# Patient Record
Sex: FEMALE | Race: WHITE | Employment: FULL TIME | URBAN - METROPOLITAN AREA
[De-identification: names, ages, dates, MRNs, and addresses within clinical notes are randomized per-mention and may not be internally consistent; named-entity substitution may affect disease eponyms.]

---

## 2019-05-08 ENCOUNTER — HOSPITAL ENCOUNTER (OUTPATIENT)
Age: 29
Discharge: HOME | End: 2019-05-08

## 2019-05-08 LAB — RUBV IGG SER QL: 23.4 IU/ML

## 2019-05-08 PROCEDURE — 86762 RUBELLA ANTIBODY: CPT

## 2019-05-08 PROCEDURE — 86787 VARICELLA-ZOSTER ANTIBODY: CPT

## 2019-05-08 PROCEDURE — 36415 COLL VENOUS BLD VENIPUNCTURE: CPT

## 2019-05-08 PROCEDURE — 86735 MUMPS ANTIBODY: CPT

## 2019-05-08 PROCEDURE — 86481 TB AG RESPONSE T-CELL SUSP: CPT

## 2019-05-08 PROCEDURE — 86765 RUBEOLA ANTIBODY: CPT

## 2019-05-10 LAB
MEASLES IMMUNE (IGG): 1.72
MUV IGG SER QL: 1.81
VZV IGG SER QL IA: 1.5

## 2019-05-11 LAB — T-SPOT TB TEST: NORMAL

## 2019-06-04 ENCOUNTER — OFFICE VISIT (OUTPATIENT)
Dept: PRIMARY CARE CLINIC | Age: 29
End: 2019-06-04
Payer: COMMERCIAL

## 2019-06-04 VITALS
TEMPERATURE: 97.3 F | HEIGHT: 63 IN | HEART RATE: 80 BPM | WEIGHT: 222 LBS | BODY MASS INDEX: 39.34 KG/M2 | DIASTOLIC BLOOD PRESSURE: 87 MMHG | SYSTOLIC BLOOD PRESSURE: 123 MMHG | OXYGEN SATURATION: 98 %

## 2019-06-04 DIAGNOSIS — J02.9 SORE THROAT: Primary | ICD-10-CM

## 2019-06-04 DIAGNOSIS — J20.9 ACUTE BRONCHITIS, UNSPECIFIED ORGANISM: ICD-10-CM

## 2019-06-04 LAB — S PYO AG THROAT QL: NORMAL

## 2019-06-04 PROCEDURE — 99202 OFFICE O/P NEW SF 15 MIN: CPT | Performed by: INTERNAL MEDICINE

## 2019-06-04 PROCEDURE — 87880 STREP A ASSAY W/OPTIC: CPT | Performed by: INTERNAL MEDICINE

## 2019-06-04 RX ORDER — BUSPIRONE HYDROCHLORIDE 5 MG/1
5 TABLET ORAL
COMMUNITY

## 2019-06-04 RX ORDER — VENLAFAXINE HYDROCHLORIDE 150 MG/1
150 TABLET, EXTENDED RELEASE ORAL
COMMUNITY

## 2019-06-04 RX ORDER — AZITHROMYCIN 250 MG/1
TABLET, FILM COATED ORAL
Qty: 1 PACKET | Refills: 0 | Status: SHIPPED | OUTPATIENT
Start: 2019-06-04 | End: 2019-12-01

## 2019-06-04 RX ORDER — OMEPRAZOLE 20 MG/1
1 CAPSULE, DELAYED RELEASE ORAL
COMMUNITY

## 2019-06-04 RX ORDER — DICYCLOMINE HCL 20 MG
20 TABLET ORAL
COMMUNITY
Start: 2019-04-03

## 2019-06-04 ASSESSMENT — PATIENT HEALTH QUESTIONNAIRE - PHQ9
SUM OF ALL RESPONSES TO PHQ9 QUESTIONS 1 & 2: 0
SUM OF ALL RESPONSES TO PHQ QUESTIONS 1-9: 0
SUM OF ALL RESPONSES TO PHQ QUESTIONS 1-9: 0
2. FEELING DOWN, DEPRESSED OR HOPELESS: 0
1. LITTLE INTEREST OR PLEASURE IN DOING THINGS: 0

## 2019-06-04 ASSESSMENT — ENCOUNTER SYMPTOMS
COUGH: 1
SORE THROAT: 1

## 2019-06-04 NOTE — PROGRESS NOTES
[facility]  [address] [address]  Dept: [phone]  Dept Fax: [phone]    Jordi Hall is a 33 y.o. female who presents today for   Chief Complaint   Patient presents with    Cough     For about 4 days    Pharyngitis    and follow upof chronic medical problems: There is no problem list on file for this patient. .    No past medical history on file. No past surgical history on file. No family history on file. Social History     Tobacco Use    Smoking status: Never Smoker    Smokeless tobacco: Never Used   Substance Use Topics    Alcohol use: Not on file      Current Outpatient Medications   Medication Sig Dispense Refill    dicyclomine (BENTYL) 20 MG tablet Take 20 mg by mouth      busPIRone (BUSPAR) 5 MG tablet Take 5 mg by mouth      venlafaxine 150 MG extended release tablet Take 150 mg by mouth      RIZATRIPTAN BENZOATE PO Take by mouth      omeprazole (PRILOSEC) 20 MG delayed release capsule Take 1 tablet by mouth       No current facility-administered medications for this visit. No Known Allergies    Health Maintenance   Topic Date Due    Varicella Vaccine (1 of 2 - 13+ 2-dose series) 08/30/2004    HIV screen  08/30/2006    DTaP/Tdap/Td vaccine (1 - Tdap) 08/30/2010    Cervical cancer screen  08/30/2012    Flu vaccine  Completed    HPV vaccine  Aged Out    Pneumococcal 0-64 years Vaccine  Aged Out       Controlled Substances Monitoring:      HPI:     Cough   This is a new problem. The current episode started in the past 7 days. The problem has been gradually worsening. The cough is productive of sputum (green sputum). Associated symptoms include a sore throat. Nothing aggravates the symptoms. She has tried OTC cough suppressant for the symptoms. The treatment provided mild relief. Pharyngitis   This is a new problem. The current episode started in the past 7 days.  The problem has been unchanged. Associated symptoms include coughing and a sore throat. She has tried nothing (see above) for the symptoms. ROS:     Review of Systems   HENT: Positive for sore throat. Respiratory: Positive for cough. All other systems reviewed and are negative. Objective:     Physical Exam   Constitutional: She is oriented to person, place, and time. She appears well-developed and well-nourished. HENT:   Head: Normocephalic and atraumatic. Right Ear: External ear normal.   Left Ear: External ear normal.   Mouth/Throat: Oropharynx is clear and moist.   Neurological: She is alert and oriented to person, place, and time. Skin: Skin is warm and dry. Psychiatric: She has a normal mood and affect. Her behavior is normal.   Vitals reviewed. Strep screen negative.

## 2019-06-04 NOTE — PROGRESS NOTES
History Review  Past Medical, Family, and Social History reviewed and does contribute to the patient presenting condition    Health Maintenance   Topic Date Due    Varicella Vaccine (1 of 2 - 13+ 2-dose series) 08/30/2004    HIV screen  08/30/2006    DTaP/Tdap/Td vaccine (1 - Tdap) 08/30/2010    Cervical cancer screen  08/30/2012    Flu vaccine (Season Ended) 09/01/2019    HPV vaccine  Aged Out    Pneumococcal 0-64 years Vaccine  Aged Out

## 2019-12-01 ENCOUNTER — OFFICE VISIT (OUTPATIENT)
Dept: PRIMARY CARE CLINIC | Age: 29
End: 2019-12-01
Payer: COMMERCIAL

## 2019-12-01 VITALS
OXYGEN SATURATION: 97 % | DIASTOLIC BLOOD PRESSURE: 98 MMHG | BODY MASS INDEX: 43.58 KG/M2 | HEART RATE: 99 BPM | WEIGHT: 246 LBS | TEMPERATURE: 96.8 F | SYSTOLIC BLOOD PRESSURE: 145 MMHG

## 2019-12-01 DIAGNOSIS — J34.89 SINUS PRESSURE: ICD-10-CM

## 2019-12-01 DIAGNOSIS — H66.92 LEFT OTITIS MEDIA, UNSPECIFIED OTITIS MEDIA TYPE: Primary | ICD-10-CM

## 2019-12-01 DIAGNOSIS — R05.9 COUGH: ICD-10-CM

## 2019-12-01 DIAGNOSIS — Z91.89 AT RISK FOR SIDE EFFECT OF MEDICATION: ICD-10-CM

## 2019-12-01 PROCEDURE — 99202 OFFICE O/P NEW SF 15 MIN: CPT | Performed by: NURSE PRACTITIONER

## 2019-12-01 RX ORDER — BROMPHENIRAMINE MALEATE, PSEUDOEPHEDRINE HYDROCHLORIDE, AND DEXTROMETHORPHAN HYDROBROMIDE 2; 30; 10 MG/5ML; MG/5ML; MG/5ML
5 SYRUP ORAL 4 TIMES DAILY PRN
Qty: 120 ML | Refills: 0 | Status: SHIPPED | OUTPATIENT
Start: 2019-12-01

## 2019-12-01 RX ORDER — FLUCONAZOLE 150 MG/1
150 TABLET ORAL ONCE
Qty: 1 TABLET | Refills: 0 | Status: SHIPPED | OUTPATIENT
Start: 2019-12-01 | End: 2019-12-01

## 2019-12-01 RX ORDER — AMOXICILLIN AND CLAVULANATE POTASSIUM 875; 125 MG/1; MG/1
1 TABLET, FILM COATED ORAL 2 TIMES DAILY
Qty: 20 TABLET | Refills: 0 | Status: SHIPPED | OUTPATIENT
Start: 2019-12-01 | End: 2019-12-11

## 2019-12-01 ASSESSMENT — ENCOUNTER SYMPTOMS
EYE ITCHING: 0
SINUS PRESSURE: 1
EYE DISCHARGE: 0
SHORTNESS OF BREATH: 0
COUGH: 1
RHINORRHEA: 1
SINUS PAIN: 0
SORE THROAT: 1

## 2020-04-06 ENCOUNTER — NURSE TRIAGE (OUTPATIENT)
Dept: OTHER | Facility: CLINIC | Age: 30
End: 2020-04-06

## 2020-08-28 ENCOUNTER — EMPLOYEE WELLNESS (OUTPATIENT)
Dept: OTHER | Age: 30
End: 2020-08-28

## 2020-08-28 LAB
CHOLESTEROL/HDL RATIO: 4
CHOLESTEROL: 178 MG/DL
GLUCOSE BLD-MCNC: 105 MG/DL (ref 70–99)
HDLC SERPL-MCNC: 44 MG/DL
LDL CHOLESTEROL: 92 MG/DL (ref 0–130)
PATIENT FASTING?: YES
TRIGL SERPL-MCNC: 211 MG/DL
VLDLC SERPL CALC-MCNC: ABNORMAL MG/DL (ref 1–30)

## 2020-10-19 VITALS — BODY MASS INDEX: 47.3 KG/M2 | WEIGHT: 267 LBS

## 2021-03-16 ENCOUNTER — HOSPITAL ENCOUNTER (OUTPATIENT)
Dept: MRI IMAGING | Age: 31
Discharge: HOME | End: 2021-03-18
Payer: COMMERCIAL

## 2021-03-16 DIAGNOSIS — R10.13 EPIGASTRIC PAIN: ICD-10-CM

## 2021-03-16 PROCEDURE — 2500000003 HC RX 250 WO HCPCS: Performed by: INTERNAL MEDICINE

## 2021-03-16 PROCEDURE — 2580000003 HC RX 258: Performed by: INTERNAL MEDICINE

## 2021-03-16 PROCEDURE — 6360000004 HC RX CONTRAST MEDICATION: Performed by: INTERNAL MEDICINE

## 2021-03-16 PROCEDURE — 72197 MRI PELVIS W/O & W/DYE: CPT

## 2021-03-16 PROCEDURE — A9579 GAD-BASE MR CONTRAST NOS,1ML: HCPCS | Performed by: INTERNAL MEDICINE

## 2021-03-16 RX ORDER — SODIUM CHLORIDE 0.9 % (FLUSH) 0.9 %
10 SYRINGE (ML) INJECTION PRN
Status: DISCONTINUED | OUTPATIENT
Start: 2021-03-16 | End: 2021-03-19 | Stop reason: HOSPADM

## 2021-03-16 RX ORDER — 0.9 % SODIUM CHLORIDE 0.9 %
45 INTRAVENOUS SOLUTION INTRAVENOUS ONCE
Status: COMPLETED | OUTPATIENT
Start: 2021-03-16 | End: 2021-03-16

## 2021-03-16 RX ADMIN — Medication 10 ML: at 10:27

## 2021-03-16 RX ADMIN — GLUCAGON HYDROCHLORIDE 0.4 MG: KIT at 10:09

## 2021-03-16 RX ADMIN — GADOTERIDOL 20 ML: 279.3 INJECTION, SOLUTION INTRAVENOUS at 10:27

## 2021-03-16 RX ADMIN — GLUCAGON HYDROCHLORIDE 0.4 MG: KIT at 10:11

## 2021-03-16 RX ADMIN — SODIUM CHLORIDE 45 ML: 9 INJECTION, SOLUTION INTRAVENOUS at 10:27

## 2021-03-16 RX ADMIN — BARIUM SULFATE 1350 ML: 1 SUSPENSION ORAL at 10:27
